# Patient Record
Sex: FEMALE | Race: WHITE | NOT HISPANIC OR LATINO | URBAN - METROPOLITAN AREA
[De-identification: names, ages, dates, MRNs, and addresses within clinical notes are randomized per-mention and may not be internally consistent; named-entity substitution may affect disease eponyms.]

---

## 2018-02-16 ENCOUNTER — EMERGENCY (EMERGENCY)
Facility: HOSPITAL | Age: 83
LOS: 1 days | End: 2018-02-16
Payer: MEDICARE

## 2018-02-16 PROCEDURE — 71046 X-RAY EXAM CHEST 2 VIEWS: CPT | Mod: 26

## 2018-02-16 PROCEDURE — 99284 EMERGENCY DEPT VISIT MOD MDM: CPT

## 2018-03-26 ENCOUNTER — APPOINTMENT (OUTPATIENT)
Dept: ORTHOPEDIC SURGERY | Facility: CLINIC | Age: 83
End: 2018-03-26
Payer: MEDICARE

## 2018-03-26 VITALS
HEART RATE: 54 BPM | DIASTOLIC BLOOD PRESSURE: 79 MMHG | BODY MASS INDEX: 25.19 KG/M2 | SYSTOLIC BLOOD PRESSURE: 157 MMHG | HEIGHT: 58 IN | WEIGHT: 120 LBS

## 2018-03-26 DIAGNOSIS — Z87.39 PERSONAL HISTORY OF OTHER DISEASES OF THE MUSCULOSKELETAL SYSTEM AND CONNECTIVE TISSUE: ICD-10-CM

## 2018-03-26 DIAGNOSIS — Z78.9 OTHER SPECIFIED HEALTH STATUS: ICD-10-CM

## 2018-03-26 DIAGNOSIS — Z82.61 FAMILY HISTORY OF ARTHRITIS: ICD-10-CM

## 2018-03-26 DIAGNOSIS — Z87.891 PERSONAL HISTORY OF NICOTINE DEPENDENCE: ICD-10-CM

## 2018-03-26 DIAGNOSIS — Z86.79 PERSONAL HISTORY OF OTHER DISEASES OF THE CIRCULATORY SYSTEM: ICD-10-CM

## 2018-03-26 DIAGNOSIS — Z80.9 FAMILY HISTORY OF MALIGNANT NEOPLASM, UNSPECIFIED: ICD-10-CM

## 2018-03-26 PROCEDURE — 99203 OFFICE O/P NEW LOW 30 MIN: CPT

## 2018-03-26 PROCEDURE — 73110 X-RAY EXAM OF WRIST: CPT | Mod: 26,50

## 2018-03-26 PROCEDURE — 73130 X-RAY EXAM OF HAND: CPT | Mod: 26,50

## 2018-03-26 RX ORDER — LEVOTHYROXINE SODIUM 0.11 MG/1
112 TABLET ORAL
Refills: 0 | Status: ACTIVE | COMMUNITY

## 2018-04-24 ENCOUNTER — OUTPATIENT (OUTPATIENT)
Dept: OUTPATIENT SERVICES | Facility: HOSPITAL | Age: 83
LOS: 1 days | End: 2018-04-24

## 2018-05-07 ENCOUNTER — OUTPATIENT (OUTPATIENT)
Dept: OUTPATIENT SERVICES | Facility: HOSPITAL | Age: 83
LOS: 1 days | End: 2018-05-07

## 2018-05-07 ENCOUNTER — OUTPATIENT (OUTPATIENT)
Dept: OUTPATIENT SERVICES | Facility: HOSPITAL | Age: 83
LOS: 1 days | End: 2018-05-07
Payer: MEDICARE

## 2018-05-07 ENCOUNTER — APPOINTMENT (OUTPATIENT)
Dept: ORTHOPEDIC SURGERY | Facility: HOSPITAL | Age: 83
End: 2018-05-07

## 2018-05-07 PROCEDURE — 64721 CARPAL TUNNEL SURGERY: CPT | Mod: RT

## 2018-05-14 ENCOUNTER — APPOINTMENT (OUTPATIENT)
Dept: ORTHOPEDIC SURGERY | Facility: CLINIC | Age: 83
End: 2018-05-14
Payer: MEDICARE

## 2018-05-14 VITALS — WEIGHT: 120 LBS | HEIGHT: 58 IN | BODY MASS INDEX: 25.19 KG/M2

## 2018-05-14 PROCEDURE — 99024 POSTOP FOLLOW-UP VISIT: CPT

## 2018-05-16 ENCOUNTER — TRANSCRIPTION ENCOUNTER (OUTPATIENT)
Age: 83
End: 2018-05-16

## 2018-06-08 PROBLEM — Z00.00 ENCOUNTER FOR PREVENTIVE HEALTH EXAMINATION: Status: ACTIVE | Noted: 2018-03-22

## 2018-06-11 ENCOUNTER — APPOINTMENT (OUTPATIENT)
Dept: ORTHOPEDIC SURGERY | Facility: CLINIC | Age: 83
End: 2018-06-11
Payer: MEDICARE

## 2018-06-11 DIAGNOSIS — Z00.00 ENCOUNTER FOR GENERAL ADULT MEDICAL EXAMINATION W/OUT ABNORMAL FINDINGS: ICD-10-CM

## 2018-06-11 PROCEDURE — 99024 POSTOP FOLLOW-UP VISIT: CPT

## 2018-10-29 ENCOUNTER — APPOINTMENT (OUTPATIENT)
Dept: ORTHOPEDIC SURGERY | Facility: CLINIC | Age: 83
End: 2018-10-29
Payer: MEDICARE

## 2018-10-29 VITALS — WEIGHT: 120 LBS | BODY MASS INDEX: 25.19 KG/M2 | HEIGHT: 58 IN

## 2018-10-29 PROCEDURE — 99214 OFFICE O/P EST MOD 30 MIN: CPT

## 2018-11-20 ENCOUNTER — OUTPATIENT (OUTPATIENT)
Dept: OUTPATIENT SERVICES | Facility: HOSPITAL | Age: 83
LOS: 1 days | End: 2018-11-20

## 2018-12-03 ENCOUNTER — OUTPATIENT (OUTPATIENT)
Dept: OUTPATIENT SERVICES | Facility: HOSPITAL | Age: 83
LOS: 1 days | End: 2018-12-03
Payer: MEDICARE

## 2018-12-03 ENCOUNTER — APPOINTMENT (OUTPATIENT)
Dept: ORTHOPEDIC SURGERY | Facility: HOSPITAL | Age: 83
End: 2018-12-03

## 2018-12-03 ENCOUNTER — OUTPATIENT (OUTPATIENT)
Dept: OUTPATIENT SERVICES | Facility: HOSPITAL | Age: 83
LOS: 1 days | End: 2018-12-03

## 2018-12-03 PROCEDURE — 64721 CARPAL TUNNEL SURGERY: CPT | Mod: LT

## 2018-12-11 PROBLEM — G56.02 CARPAL TUNNEL SYNDROME OF LEFT WRIST: Status: ACTIVE | Noted: 2018-03-26

## 2018-12-13 ENCOUNTER — APPOINTMENT (OUTPATIENT)
Dept: ORTHOPEDIC SURGERY | Facility: CLINIC | Age: 83
End: 2018-12-13
Payer: MEDICARE

## 2018-12-13 DIAGNOSIS — G56.02 CARPAL TUNNEL SYNDROME, LEFT UPPER LIMB: ICD-10-CM

## 2018-12-13 PROCEDURE — 73030 X-RAY EXAM OF SHOULDER: CPT | Mod: 26,LT

## 2018-12-13 PROCEDURE — 99024 POSTOP FOLLOW-UP VISIT: CPT

## 2020-09-21 ENCOUNTER — APPOINTMENT (OUTPATIENT)
Dept: CT IMAGING | Facility: CLINIC | Age: 85
End: 2020-09-21
Payer: MEDICARE

## 2020-09-21 PROCEDURE — 82565A: CUSTOM | Mod: QW

## 2020-09-21 PROCEDURE — 74177 CT ABD & PELVIS W/CONTRAST: CPT

## 2020-09-21 PROCEDURE — Q9967E: CUSTOM

## 2022-03-14 ENCOUNTER — EMERGENCY (EMERGENCY)
Facility: HOSPITAL | Age: 87
LOS: 1 days | Discharge: HOME | End: 2022-03-14
Admitting: EMERGENCY MEDICINE
Payer: MEDICARE

## 2022-03-14 PROCEDURE — 99284 EMERGENCY DEPT VISIT MOD MDM: CPT

## 2022-03-14 PROCEDURE — 73030 X-RAY EXAM OF SHOULDER: CPT | Mod: 26,RT

## 2022-03-14 PROCEDURE — 73060 X-RAY EXAM OF HUMERUS: CPT | Mod: 26,RT

## 2022-03-17 DIAGNOSIS — I10 ESSENTIAL (PRIMARY) HYPERTENSION: ICD-10-CM

## 2022-03-17 DIAGNOSIS — M79.601 PAIN IN RIGHT ARM: ICD-10-CM

## 2022-08-02 ENCOUNTER — INPATIENT (INPATIENT)
Facility: HOSPITAL | Age: 87
LOS: 1 days | Discharge: HOME | End: 2022-08-04

## 2022-08-02 ENCOUNTER — OUTPATIENT (OUTPATIENT)
Dept: OUTPATIENT SERVICES | Facility: HOSPITAL | Age: 87
LOS: 1 days | End: 2022-08-02

## 2022-08-02 PROCEDURE — 71045 X-RAY EXAM CHEST 1 VIEW: CPT | Mod: 26

## 2022-08-02 PROCEDURE — 99285 EMERGENCY DEPT VISIT HI MDM: CPT

## 2022-08-02 PROCEDURE — 93010 ELECTROCARDIOGRAM REPORT: CPT

## 2022-08-03 ENCOUNTER — OUTPATIENT (OUTPATIENT)
Dept: OUTPATIENT SERVICES | Facility: HOSPITAL | Age: 87
LOS: 1 days | End: 2022-08-03

## 2022-08-04 ENCOUNTER — OUTPATIENT (OUTPATIENT)
Dept: OUTPATIENT SERVICES | Facility: HOSPITAL | Age: 87
LOS: 1 days | End: 2022-08-04
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
## 2022-08-10 DIAGNOSIS — T46.5X2A POISONING BY OTHER ANTIHYPERTENSIVE DRUGS, INTENTIONAL SELF-HARM, INITIAL ENCOUNTER: ICD-10-CM

## 2022-08-10 DIAGNOSIS — T50.2X5A ADVERSE EFFECT OF CARBONIC-ANHYDRASE INHIBITORS, BENZOTHIADIAZIDES AND OTHER DIURETICS, INITIAL ENCOUNTER: ICD-10-CM

## 2022-08-10 DIAGNOSIS — T39.1X2A POISONING BY 4-AMINOPHENOL DERIVATIVES, INTENTIONAL SELF-HARM, INITIAL ENCOUNTER: ICD-10-CM
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
## 2022-08-10 DIAGNOSIS — F32.A DEPRESSION, UNSPECIFIED: ICD-10-CM

## 2022-08-10 DIAGNOSIS — E03.9 HYPOTHYROIDISM, UNSPECIFIED: ICD-10-CM

## 2022-08-10 DIAGNOSIS — Z87.891 PERSONAL HISTORY OF NICOTINE DEPENDENCE: ICD-10-CM

## 2022-08-10 DIAGNOSIS — Z20.822 CONTACT WITH AND (SUSPECTED) EXPOSURE TO COVID-19: ICD-10-CM

## 2022-08-10 DIAGNOSIS — I10 ESSENTIAL (PRIMARY) HYPERTENSION: ICD-10-CM

## 2022-08-10 DIAGNOSIS — R35.0 FREQUENCY OF MICTURITION: ICD-10-CM

## 2022-08-10 DIAGNOSIS — T50.992A POISONING BY OTHER DRUGS, MEDICAMENTS AND BIOLOGICAL SUBSTANCES, INTENTIONAL SELF-HARM, INITIAL ENCOUNTER: ICD-10-CM

## 2022-08-10 DIAGNOSIS — R25.2 CRAMP AND SPASM: ICD-10-CM
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
## 2022-08-10 DIAGNOSIS — E87.1 HYPO-OSMOLALITY AND HYPONATREMIA: ICD-10-CM

## 2022-08-22 DIAGNOSIS — F41.9 ANXIETY DISORDER, UNSPECIFIED: ICD-10-CM

## 2022-08-22 DIAGNOSIS — F43.0 ACUTE STRESS REACTION: ICD-10-CM

## 2022-08-22 DIAGNOSIS — F32.A DEPRESSION, UNSPECIFIED: ICD-10-CM

## 2022-08-27 DIAGNOSIS — T39.8X2A POISONING BY OTHER NONOPIOID ANALGESICS AND ANTIPYRETICS, NOT ELSEWHERE CLASSIFIED, INTENTIONAL SELF-HARM, INITIAL ENCOUNTER: ICD-10-CM

## 2022-08-27 DIAGNOSIS — E03.9 HYPOTHYROIDISM, UNSPECIFIED: ICD-10-CM

## 2022-08-27 DIAGNOSIS — I10 ESSENTIAL (PRIMARY) HYPERTENSION: ICD-10-CM

## 2022-08-30 DIAGNOSIS — E03.9 HYPOTHYROIDISM, UNSPECIFIED: ICD-10-CM

## 2022-08-30 DIAGNOSIS — I10 ESSENTIAL (PRIMARY) HYPERTENSION: ICD-10-CM

## 2022-08-30 DIAGNOSIS — T39.8X2A POISONING BY OTHER NONOPIOID ANALGESICS AND ANTIPYRETICS, NOT ELSEWHERE CLASSIFIED, INTENTIONAL SELF-HARM, INITIAL ENCOUNTER: ICD-10-CM

## 2022-10-07 ENCOUNTER — NON-APPOINTMENT (OUTPATIENT)
Age: 87
End: 2022-10-07

## 2022-10-07 ENCOUNTER — APPOINTMENT (OUTPATIENT)
Dept: OPHTHALMOLOGY | Facility: CLINIC | Age: 87
End: 2022-10-07

## 2022-10-07 PROCEDURE — 92004 COMPRE OPH EXAM NEW PT 1/>: CPT

## 2022-10-07 PROCEDURE — 92134 CPTRZ OPH DX IMG PST SGM RTA: CPT

## 2022-11-04 ENCOUNTER — NON-APPOINTMENT (OUTPATIENT)
Age: 87
End: 2022-11-04

## 2022-11-04 ENCOUNTER — APPOINTMENT (OUTPATIENT)
Dept: OPHTHALMOLOGY | Facility: CLINIC | Age: 87
End: 2022-11-04

## 2022-11-04 PROCEDURE — 99213 OFFICE O/P EST LOW 20 MIN: CPT

## 2023-06-09 ENCOUNTER — APPOINTMENT (OUTPATIENT)
Dept: OPHTHALMOLOGY | Facility: CLINIC | Age: 88
End: 2023-06-09

## 2023-09-15 ENCOUNTER — RX ONLY (RX ONLY)
Age: 88
End: 2023-09-15

## 2023-09-15 ENCOUNTER — OFFICE (OUTPATIENT)
Dept: URBAN - METROPOLITAN AREA CLINIC 103 | Facility: CLINIC | Age: 88
Setting detail: OPHTHALMOLOGY
End: 2023-09-15
Payer: MEDICARE

## 2023-09-15 DIAGNOSIS — H35.363: ICD-10-CM

## 2023-09-15 DIAGNOSIS — H01.004: ICD-10-CM

## 2023-09-15 DIAGNOSIS — H01.005: ICD-10-CM

## 2023-09-15 DIAGNOSIS — H52.4: ICD-10-CM

## 2023-09-15 DIAGNOSIS — Z96.1: ICD-10-CM

## 2023-09-15 DIAGNOSIS — H01.001: ICD-10-CM

## 2023-09-15 DIAGNOSIS — H00.11: ICD-10-CM

## 2023-09-15 DIAGNOSIS — H01.002: ICD-10-CM

## 2023-09-15 PROCEDURE — 92004 COMPRE OPH EXAM NEW PT 1/>: CPT | Performed by: OPHTHALMOLOGY

## 2023-09-15 PROCEDURE — 92134 CPTRZ OPH DX IMG PST SGM RTA: CPT | Performed by: OPHTHALMOLOGY

## 2023-09-15 PROCEDURE — 92015 DETERMINE REFRACTIVE STATE: CPT | Performed by: OPHTHALMOLOGY

## 2023-09-15 ASSESSMENT — SPHEQUIV_DERIVED
OS_SPHEQUIV: -0.625
OD_SPHEQUIV: -1
OS_SPHEQUIV: -0.5
OD_SPHEQUIV: -1

## 2023-09-15 ASSESSMENT — REFRACTION_CURRENTRX
OS_CYLINDER: -1.50
OD_VPRISM_DIRECTION: BF
OD_AXIS: 087
OD_SPHERE: -0.25
OD_CYLINDER: -1.00
OS_ADD: +2.50
OD_ADD: +2.50
OS_VPRISM_DIRECTION: BF
OD_OVR_VA: 20/
OS_SPHERE: PLANO
OS_AXIS: 084
OS_OVR_VA: 20/

## 2023-09-15 ASSESSMENT — REFRACTION_MANIFEST
OS_AXIS: 085
OD_CYLINDER: -0.50
OD_VA1: 20/40
OD_AXIS: 080
OD_ADD: +2.75
OS_CYLINDER: -1.50
OS_VA1: 20/20
OS_ADD: +2.75
OS_SPHERE: +0.25
OD_SPHERE: -0.75

## 2023-09-15 ASSESSMENT — CONFRONTATIONAL VISUAL FIELD TEST (CVF)
OD_FINDINGS: FULL
OS_FINDINGS: FULL

## 2023-09-15 ASSESSMENT — AXIALLENGTH_DERIVED
OD_AL: 23.9407
OS_AL: 23.8849
OD_AL: 23.9407
OS_AL: 23.8351

## 2023-09-15 ASSESSMENT — REFRACTION_AUTOREFRACTION
OS_CYLINDER: -1.75
OD_SPHERE: -0.75
OS_SPHERE: +0.25
OD_AXIS: 080
OS_AXIS: 085
OD_CYLINDER: -0.50

## 2023-09-15 ASSESSMENT — LID EXAM ASSESSMENTS
OS_BLEPHARITIS: LLL LUL 1+ 2+
OD_BLEPHARITIS: RLL RUL 1+ 2+

## 2023-09-15 ASSESSMENT — KERATOMETRY
OD_AXISANGLE_DEGREES: 027
OD_K2POWER_DIOPTERS: 44.00
OS_K2POWER_DIOPTERS: 43.75
OS_AXISANGLE_DEGREES: 008
OS_K1POWER_DIOPTERS: 43.00
OD_K1POWER_DIOPTERS: 43.25

## 2023-09-15 ASSESSMENT — TONOMETRY
OS_IOP_MMHG: 10
OD_IOP_MMHG: 10

## 2023-09-15 ASSESSMENT — VISUAL ACUITY
OD_BCVA: 20/20-2
OS_BCVA: 20/50-2

## 2023-12-29 ENCOUNTER — APPOINTMENT (OUTPATIENT)
Dept: CARDIOLOGY | Facility: CLINIC | Age: 88
End: 2023-12-29

## 2024-01-09 ENCOUNTER — APPOINTMENT (OUTPATIENT)
Dept: CARDIOLOGY | Facility: CLINIC | Age: 89
End: 2024-01-09
Payer: MEDICARE

## 2024-01-09 VITALS
WEIGHT: 128 LBS | OXYGEN SATURATION: 99 % | SYSTOLIC BLOOD PRESSURE: 110 MMHG | HEART RATE: 61 BPM | BODY MASS INDEX: 26.87 KG/M2 | DIASTOLIC BLOOD PRESSURE: 58 MMHG | HEIGHT: 58 IN

## 2024-01-09 PROCEDURE — 99024 POSTOP FOLLOW-UP VISIT: CPT

## 2024-01-09 RX ORDER — DILTIAZEM HYDROCHLORIDE 120 MG/1
120 TABLET, COATED ORAL
Refills: 0 | Status: DISCONTINUED | COMMUNITY
End: 2024-01-09

## 2024-01-09 RX ORDER — CEPHALEXIN 500 MG/1
500 CAPSULE ORAL 4 TIMES DAILY
Qty: 8 | Refills: 0 | Status: DISCONTINUED | COMMUNITY
Start: 2018-05-14 | End: 2024-01-09

## 2024-01-09 RX ORDER — METOPROLOL SUCCINATE 25 MG/1
25 TABLET, EXTENDED RELEASE ORAL DAILY
Qty: 90 | Refills: 3 | Status: DISCONTINUED | COMMUNITY
Start: 2024-01-09 | End: 2024-01-09

## 2024-01-09 NOTE — PROCEDURE
[Complete Heart Block] : complete heart block [See Device Printout] : See device printout [Pacemaker] : pacemaker [DDD] : DDD [Voltage: ___ volts] : Voltage was [unfilled] volts [Threshold Testing Performed] : Threshold testing was performed [Lead Imp:  ___ohms] : lead impedance was [unfilled] ohms [Sensing Amplitude ___mv] : sensing amplitude was [unfilled] mv [___V @] : [unfilled] V [___ ms] : [unfilled] ms [de-identified] : Medtronic [de-identified] : Thais BOO [id number] [de-identified] : [id number] [de-identified] : 12/22/23 [de-identified] :  [de-identified] : 3.7 years [de-identified] : AP: 78.1% : 99%  Wound CDI with minimal swelling and erythema.  Wound visualized by Dr. Romero.  No apparent infection.  Red flag symptoms that would warrant emergent evaluation discussed.  Pt verbalizes understanding.  1 episode of NSVT.  16 beat.   Stop Cardizem and start Toprol 25mg QD. Establish care with general cardiology.

## 2024-01-16 RX ORDER — METOPROLOL SUCCINATE 50 MG/1
50 TABLET, EXTENDED RELEASE ORAL DAILY
Qty: 90 | Refills: 3 | Status: DISCONTINUED | COMMUNITY
Start: 2024-01-09 | End: 2024-01-16

## 2024-01-25 ENCOUNTER — APPOINTMENT (OUTPATIENT)
Dept: CARDIOLOGY | Facility: CLINIC | Age: 89
End: 2024-01-25
Payer: MEDICARE

## 2024-01-25 VITALS
HEIGHT: 58 IN | HEART RATE: 59 BPM | OXYGEN SATURATION: 98 % | WEIGHT: 129 LBS | DIASTOLIC BLOOD PRESSURE: 50 MMHG | SYSTOLIC BLOOD PRESSURE: 124 MMHG | BODY MASS INDEX: 27.08 KG/M2

## 2024-01-25 DIAGNOSIS — G56.01 CARPAL TUNNEL SYNDROME, RIGHT UPPER LIMB: ICD-10-CM

## 2024-01-25 PROCEDURE — 99215 OFFICE O/P EST HI 40 MIN: CPT

## 2024-01-25 RX ORDER — SPIRONOLACTONE 25 MG/1
25 TABLET ORAL DAILY
Qty: 90 | Refills: 2 | Status: DISCONTINUED | COMMUNITY
End: 2024-01-25

## 2024-01-25 RX ORDER — FUROSEMIDE 40 MG/1
40 TABLET ORAL
Qty: 90 | Refills: 0 | Status: DISCONTINUED | COMMUNITY
End: 2024-01-25

## 2024-01-25 RX ORDER — AMLODIPINE BESYLATE 2.5 MG/1
2.5 TABLET ORAL
Refills: 0 | Status: DISCONTINUED | COMMUNITY
End: 2024-01-25

## 2024-04-03 ENCOUNTER — APPOINTMENT (OUTPATIENT)
Dept: CARDIOLOGY | Facility: CLINIC | Age: 89
End: 2024-04-03
Payer: MEDICARE

## 2024-04-03 VITALS
WEIGHT: 136 LBS | HEART RATE: 60 BPM | DIASTOLIC BLOOD PRESSURE: 68 MMHG | OXYGEN SATURATION: 98 % | SYSTOLIC BLOOD PRESSURE: 136 MMHG | BODY MASS INDEX: 28.55 KG/M2 | HEIGHT: 58 IN

## 2024-04-03 PROCEDURE — 93280 PM DEVICE PROGR EVAL DUAL: CPT

## 2024-04-03 RX ORDER — LOSARTAN POTASSIUM 50 MG/1
50 TABLET, FILM COATED ORAL
Qty: 90 | Refills: 1 | Status: ACTIVE | COMMUNITY
Start: 1900-01-01 | End: 1900-01-01

## 2024-04-03 RX ORDER — HYDROCHLOROTHIAZIDE 25 MG/1
25 TABLET ORAL DAILY
Qty: 90 | Refills: 1 | Status: ACTIVE | COMMUNITY
Start: 2024-02-05 | End: 1900-01-01

## 2024-04-03 RX ORDER — LABETALOL HYDROCHLORIDE 200 MG/1
200 TABLET, FILM COATED ORAL
Qty: 180 | Refills: 3 | Status: ACTIVE | COMMUNITY
Start: 1900-01-01 | End: 1900-01-01

## 2024-04-03 NOTE — PROCEDURE
[Complete Heart Block] : complete heart block [See Device Printout] : See device printout [Pacemaker] : pacemaker [DDD] : DDD [Voltage: ___ volts] : Voltage was [unfilled] volts [Threshold Testing Performed] : Threshold testing was performed [Lead Imp:  ___ohms] : lead impedance was [unfilled] ohms [Sensing Amplitude ___mv] : sensing amplitude was [unfilled] mv [___V @] : [unfilled] V [___ ms] : [unfilled] ms [de-identified] : Medtronic [de-identified] : Thais BOO [id number] [de-identified] : [id number] [de-identified] : 12/22/23 [de-identified] :  [de-identified] : 8.6 years [de-identified] : AP: 84.2% : 99.7%  1 episode of NSVT.  10 beat.  Asymptomatic.  On labetolol.   Improved from prior interrrogation.   3 month DRC 6 month DVC

## 2024-04-22 PROBLEM — M25.471 ANKLE EDEMA, BILATERAL: Status: ACTIVE | Noted: 2024-02-05

## 2024-04-25 ENCOUNTER — APPOINTMENT (OUTPATIENT)
Dept: CARDIOLOGY | Facility: CLINIC | Age: 89
End: 2024-04-25
Payer: MEDICARE

## 2024-04-25 VITALS
DIASTOLIC BLOOD PRESSURE: 70 MMHG | SYSTOLIC BLOOD PRESSURE: 152 MMHG | WEIGHT: 137 LBS | BODY MASS INDEX: 28.76 KG/M2 | HEART RATE: 60 BPM | HEIGHT: 58 IN | OXYGEN SATURATION: 98 %

## 2024-04-25 DIAGNOSIS — I44.2 ATRIOVENTRICULAR BLOCK, COMPLETE: ICD-10-CM

## 2024-04-25 DIAGNOSIS — I47.29 OTHER VENTRICULAR TACHYCARDIA: ICD-10-CM

## 2024-04-25 DIAGNOSIS — M25.472 EFFUSION, RIGHT ANKLE: ICD-10-CM

## 2024-04-25 DIAGNOSIS — S46.912S STRAIN OF UNSPECIFIED MUSCLE, FASCIA AND TENDON AT SHOULDER AND UPPER ARM LEVEL, LEFT ARM, SEQUELA: ICD-10-CM

## 2024-04-25 DIAGNOSIS — M25.471 EFFUSION, RIGHT ANKLE: ICD-10-CM

## 2024-04-25 PROCEDURE — G2211 COMPLEX E/M VISIT ADD ON: CPT

## 2024-04-25 PROCEDURE — 99215 OFFICE O/P EST HI 40 MIN: CPT

## 2024-04-25 RX ORDER — MULTIVITAMIN
TABLET ORAL
Refills: 0 | Status: ACTIVE | COMMUNITY

## 2024-04-25 NOTE — DISCUSSION/SUMMARY
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  Prior soft BPs with dizziness, discontinued Lasix, Aldactone, amlodipine.  - Chronic low back pain limiting ambulation, patient will follow-up with PCP for pain management referral  Advised patient to follow active lifestyle with regular cardiovascular exercise. Patient educated on heart healthy diet. Recommend increased oral hydration with electrolyte supplement drinks, avoid excess alcohol and caffeine.  Patient is aware to call with any symptoms or concerns.   Cardiology follow-up 6 months same-day pacemaker check and echocardiogram, labs ordered.  Order will follow-up with Dr. Mikala Bradley for primary care  Total time spent 45 minutes, reviewing of test results, chart information, patient discussion, physical exam and completion of chart documentation.

## 2024-04-25 NOTE — REASON FOR VISIT
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  No history of CAD, MI, revascularization, VHD, CHF, TIA, CVA, diabetes, PVD, DVT, PE, arrhythmia, AF.  Patient has dyspnea with moderate exertion.  Patient has dizziness with head position likely vertigo.  Cardiovascular review of symptoms is negative for exertional chest pain, palpitations or syncope.  No PND or orthopnea leg edema.  No bleeding or black stool.  No exercise routine.  Patient is walking less than 5 minutes, limited ambulation with back pain  EKG Dec 2023 a sensing V pacing  Echocardiogram [facility] 12/22/2023 LVEF 60 to 65%, moderate MR, severe LAE, moderate PAH

## 2024-05-04 ENCOUNTER — LABORATORY RESULT (OUTPATIENT)
Age: 89
End: 2024-05-04

## 2024-07-03 ENCOUNTER — APPOINTMENT (OUTPATIENT)
Dept: CARDIOLOGY | Facility: CLINIC | Age: 89
End: 2024-07-03

## 2024-07-05 ENCOUNTER — APPOINTMENT (OUTPATIENT)
Dept: CARDIOLOGY | Facility: CLINIC | Age: 89
End: 2024-07-05
Payer: MEDICARE

## 2024-07-05 ENCOUNTER — NON-APPOINTMENT (OUTPATIENT)
Age: 89
End: 2024-07-05

## 2024-07-05 PROCEDURE — 93294 REM INTERROG EVL PM/LDLS PM: CPT

## 2024-07-05 PROCEDURE — 93296 REM INTERROG EVL PM/IDS: CPT

## 2024-10-03 ENCOUNTER — APPOINTMENT (OUTPATIENT)
Dept: CARDIOLOGY | Facility: CLINIC | Age: 89
End: 2024-10-03

## 2024-10-16 ENCOUNTER — APPOINTMENT (OUTPATIENT)
Dept: CARDIOLOGY | Facility: CLINIC | Age: 89
End: 2024-10-16
Payer: MEDICARE

## 2024-10-16 VITALS
HEIGHT: 58 IN | WEIGHT: 137 LBS | SYSTOLIC BLOOD PRESSURE: 142 MMHG | HEART RATE: 60 BPM | BODY MASS INDEX: 28.76 KG/M2 | DIASTOLIC BLOOD PRESSURE: 70 MMHG | OXYGEN SATURATION: 98 %

## 2024-10-16 DIAGNOSIS — I44.2 ATRIOVENTRICULAR BLOCK, COMPLETE: ICD-10-CM

## 2024-10-16 DIAGNOSIS — I47.29 OTHER VENTRICULAR TACHYCARDIA: ICD-10-CM

## 2024-10-16 PROCEDURE — 93306 TTE W/DOPPLER COMPLETE: CPT

## 2024-10-16 PROCEDURE — 93280 PM DEVICE PROGR EVAL DUAL: CPT

## 2024-10-16 PROCEDURE — 99214 OFFICE O/P EST MOD 30 MIN: CPT | Mod: 25

## 2024-10-16 PROCEDURE — 76376 3D RENDER W/INTRP POSTPROCES: CPT

## 2024-10-17 NOTE — PROCEDURE
[Complete Heart Block] : complete heart block [See Device Printout] : See device printout [Pacemaker] : pacemaker [DDD] : DDD [Voltage: ___ volts] : Voltage was [unfilled] volts [Threshold Testing Performed] : Threshold testing was performed [Lead Imp:  ___ohms] : lead impedance was [unfilled] ohms [Sensing Amplitude ___mv] : sensing amplitude was [unfilled] mv [___V @] : [unfilled] V [___ ms] : [unfilled] ms [de-identified] : Medtronic [de-identified] : Rosio ZHU [id number] [de-identified] : [id number] [de-identified] : 12/22/23 [de-identified] :  [de-identified] : 11.2 years [de-identified] : AP: 85.9% : 99%  2 episodes of NSVT.  9 & 10 beat.  Asymptomatic.  On labetolol.  Hx of same.  Intolerant to metoprolol.  Normal EF.  Refusing medication changes at this time.  Red flag symptoms that would warrant emergent evaluation discussed.  Pt and daughter verbalizes understanding.  3 month DRC 6 month DVC

## 2024-10-17 NOTE — PROCEDURE
[Complete Heart Block] : complete heart block [See Device Printout] : See device printout [Pacemaker] : pacemaker [DDD] : DDD [Voltage: ___ volts] : Voltage was [unfilled] volts [Threshold Testing Performed] : Threshold testing was performed [Lead Imp:  ___ohms] : lead impedance was [unfilled] ohms [Sensing Amplitude ___mv] : sensing amplitude was [unfilled] mv [___V @] : [unfilled] V [___ ms] : [unfilled] ms [de-identified] : Medtronic [de-identified] : Rosio ZHU [id number] [de-identified] : [id number] [de-identified] : 12/22/23 [de-identified] :  [de-identified] : 11.2 years [de-identified] : AP: 85.9% : 99%  2 episodes of NSVT.  9 & 10 beat.  Asymptomatic.  On labetolol.  Hx of same.  Intolerant to metoprolol.  Normal EF.  Refusing medication changes at this time.  Red flag symptoms that would warrant emergent evaluation discussed.  Pt and daughter verbalizes understanding.  3 month DRC 6 month DVC

## 2024-10-18 NOTE — PHYSICAL EXAM
[Well Developed] : well developed [Well Nourished] : well nourished [No Acute Distress] : no acute distress [Frail] : frail [Normal S1, S2] : normal S1, S2 [No Murmur] : no murmur [No Rub] : no rub [No Gallop] : no gallop [Clear Lung Fields] : clear lung fields [Good Air Entry] : good air entry [No Respiratory Distress] : no respiratory distress  [Normal Bowel Sounds] : normal bowel sounds [No Edema] : no edema [No Cyanosis] : no cyanosis [No Varicosities] : no varicosities [Moves all extremities] : moves all extremities [Alert and Oriented] : alert and oriented [No Clubbing] : no clubbing [Normal Speech] : normal speech [de-identified] : Slow gait.  Walks with cane/walker.

## 2024-10-18 NOTE — PHYSICAL EXAM
[Well Developed] : well developed [Well Nourished] : well nourished [No Acute Distress] : no acute distress [Frail] : frail [Normal S1, S2] : normal S1, S2 [No Murmur] : no murmur [No Rub] : no rub [No Gallop] : no gallop [Clear Lung Fields] : clear lung fields [Good Air Entry] : good air entry [No Respiratory Distress] : no respiratory distress  [Normal Bowel Sounds] : normal bowel sounds [No Edema] : no edema [No Cyanosis] : no cyanosis [No Varicosities] : no varicosities [Moves all extremities] : moves all extremities [Alert and Oriented] : alert and oriented [No Clubbing] : no clubbing [Normal Speech] : normal speech [de-identified] : Slow gait.  Walks with cane/walker.

## 2024-10-18 NOTE — PHYSICAL EXAM
[Well Developed] : well developed [Well Nourished] : well nourished [No Acute Distress] : no acute distress [Frail] : frail [Normal S1, S2] : normal S1, S2 [No Murmur] : no murmur [No Rub] : no rub [No Gallop] : no gallop [Clear Lung Fields] : clear lung fields [Good Air Entry] : good air entry [No Respiratory Distress] : no respiratory distress  [Normal Bowel Sounds] : normal bowel sounds [No Edema] : no edema [No Cyanosis] : no cyanosis [No Varicosities] : no varicosities [Moves all extremities] : moves all extremities [Alert and Oriented] : alert and oriented [No Clubbing] : no clubbing [Normal Speech] : normal speech [de-identified] : Slow gait.  Walks with cane/walker.

## 2024-10-18 NOTE — DISCUSSION/SUMMARY
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  BP has now been elevated for the past 2 visits.  Discussed restarting low dose amlodipine, but pt wishes to not take any more medications.  NSVT noted on device check.  She was intolerant to metoprolol.  Does not want to adjust meds.  Continue to monitor.  Normal EF on echo today.   - Chronic low back pain limiting ambulation.  Given contact information for Dr. Oneil.  Physiatrist.   Advised patient to follow active lifestyle with regular cardiovascular exercise. Patient educated on heart healthy diet. Recommend increased oral hydration with electrolyte supplement drinks, avoid excess alcohol and caffeine.  Patient is aware to call with any symptoms or concerns.   Cardiology follow-up 6 months same-day pacemaker check.

## 2024-10-18 NOTE — REASON FOR VISIT
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  No history of CAD, MI, revascularization, VHD, CHF, TIA, CVA, diabetes, PVD, DVT, PE, arrhythmia, AF.  Patient has dyspnea with moderate exertion.  Patient has dizziness with head position likely vertigo.   Major limitation is low back pain. Cardiovascular review of symptoms is negative for exertional chest pain, palpitations or syncope.  No PND or orthopnea leg edema.  No bleeding or black stool.  No exercise routine.  Patient is walking less than 5 minutes, limited ambulation with back pain  Device interrogation today with normal function.  2 episodes of NSVT noted.  9 beat and 16 beat.  Asymptomatic.  Pt intolerant to metoprolol with "not feeling right".  On Labetalol.    Echocardiogram 10/16/24:  Left ventricular systolic function is normal with an ejection fraction of 64 % with an ejection fraction visually estimated at 60 to 65 %.  There is moderate (grade 2) left ventricular diastolic dysfunction.  Left atrium is moderately dilated.  Mild to moderate mitral regurgitation.  Mild tricuspid regurgitation.   No pericardial effusion seen.  Estimated pulmonary artery systolic pressure is 29 mmHg.  Compared to the transthoracic echocardiogram performed on 12/22/2023, Decrease in PASP.  Echocardiogram [facility] 12/22/2023 LVEF 60 to 65%, moderate MR, severe LAE, moderate PAH  EKG Dec 2023 a sensing V pacing

## 2024-10-26 ENCOUNTER — OFFICE (OUTPATIENT)
Dept: URBAN - METROPOLITAN AREA CLINIC 38 | Facility: CLINIC | Age: 89
Setting detail: OPHTHALMOLOGY
End: 2024-10-26
Payer: MEDICARE

## 2024-10-26 DIAGNOSIS — Z96.1: ICD-10-CM

## 2024-10-26 DIAGNOSIS — H01.002: ICD-10-CM

## 2024-10-26 DIAGNOSIS — H01.004: ICD-10-CM

## 2024-10-26 DIAGNOSIS — H52.4: ICD-10-CM

## 2024-10-26 DIAGNOSIS — H35.3132: ICD-10-CM

## 2024-10-26 DIAGNOSIS — H01.005: ICD-10-CM

## 2024-10-26 DIAGNOSIS — H43.392: ICD-10-CM

## 2024-10-26 DIAGNOSIS — H01.001: ICD-10-CM

## 2024-10-26 DIAGNOSIS — H43.812: ICD-10-CM

## 2024-10-26 PROCEDURE — 92014 COMPRE OPH EXAM EST PT 1/>: CPT | Performed by: OPHTHALMOLOGY

## 2024-10-26 PROCEDURE — 92134 CPTRZ OPH DX IMG PST SGM RTA: CPT | Performed by: OPHTHALMOLOGY

## 2024-10-26 PROCEDURE — 92015 DETERMINE REFRACTIVE STATE: CPT | Performed by: OPHTHALMOLOGY

## 2024-10-26 ASSESSMENT — REFRACTION_CURRENTRX
OD_SPHERE: -0.25
OS_OVR_VA: 20/
OD_VPRISM_DIRECTION: BF
OS_CYLINDER: -0.75
OD_CYLINDER: -0.75
OD_OVR_VA: 20/
OS_VPRISM_DIRECTION: BF
OS_CYLINDER: -1.50
OD_ADD: +2.75
OS_VPRISM_DIRECTION: BF
OD_AXIS: 058
OD_AXIS: 087
OS_SPHERE: PLANO
OS_OVR_VA: 20/
OD_ADD: +2.50
OS_ADD: +2.75
OS_AXIS: 093
OD_VPRISM_DIRECTION: BF
OS_SPHERE: +0.25
OS_ADD: +2.50
OS_AXIS: 084
OD_CYLINDER: -1.00
OD_OVR_VA: 20/
OD_SPHERE: -0.50

## 2024-10-26 ASSESSMENT — VISUAL ACUITY
OS_BCVA: 20/60-
OD_BCVA: 20/30-

## 2024-10-26 ASSESSMENT — REFRACTION_MANIFEST
OS_AXIS: 080
OS_VA1: 20/30-2
OS_ADD: +2.75
OD_VA1: 20/40-2
OS_CYLINDER: -1.50
OU_VA: 20/30-
OD_ADD: +2.75
OD_AXIS: 115
OD_AXIS: 115
OS_VA1: 20/30-2
OS_SPHERE: PLANO
OS_ADD: +3.00
OU_VA: 20/30-
OD_SPHERE: -0.75
OD_CYLINDER: -0.50
OS_VA2: 20/25(J1)
OD_SPHERE: -0.75
OS_SPHERE: PLANO
OS_VA2: 20/25(J1)
OD_CYLINDER: -0.50
OD_VA1: 20/40-2
OS_CYLINDER: -1.50
OD_VA2: 20/25(J1)
OD_ADD: +3.00
OD_VA2: 20/25(J1)
OS_AXIS: 080

## 2024-10-26 ASSESSMENT — CONFRONTATIONAL VISUAL FIELD TEST (CVF)
OS_FINDINGS: FULL
OD_FINDINGS: FULL

## 2024-10-26 ASSESSMENT — KERATOMETRY
OS_AXISANGLE_DEGREES: 166
OS_K1POWER_DIOPTERS: 42.50
OD_K2POWER_DIOPTERS: 44.00
OS_K2POWER_DIOPTERS: 43.50
OD_K1POWER_DIOPTERS: 42.75
OD_AXISANGLE_DEGREES: 020

## 2024-10-26 ASSESSMENT — REFRACTION_AUTOREFRACTION
OD_SPHERE: +0.50
OD_AXIS: 113
OS_AXIS: 078
OS_CYLINDER: -2.75
OD_CYLINDER: -1.75
OS_SPHERE: +1.25

## 2024-10-26 ASSESSMENT — LID EXAM ASSESSMENTS
OD_BLEPHARITIS: RLL RUL 1+ 2+
OS_BLEPHARITIS: LLL LUL 1+ 2+

## 2024-10-26 ASSESSMENT — TONOMETRY: OD_IOP_MMHG: 10

## 2024-12-18 ENCOUNTER — APPOINTMENT (OUTPATIENT)
Dept: CARDIOLOGY | Facility: CLINIC | Age: 88
End: 2024-12-18
Payer: MEDICARE

## 2024-12-18 ENCOUNTER — NON-APPOINTMENT (OUTPATIENT)
Age: 88
End: 2024-12-18

## 2024-12-18 PROCEDURE — 93294 REM INTERROG EVL PM/LDLS PM: CPT

## 2024-12-18 PROCEDURE — 93296 REM INTERROG EVL PM/IDS: CPT

## 2025-03-20 ENCOUNTER — APPOINTMENT (OUTPATIENT)
Dept: CARDIOLOGY | Facility: CLINIC | Age: 89
End: 2025-03-20
Payer: MEDICARE

## 2025-03-20 ENCOUNTER — NON-APPOINTMENT (OUTPATIENT)
Age: 89
End: 2025-03-20

## 2025-03-20 PROCEDURE — 93296 REM INTERROG EVL PM/IDS: CPT

## 2025-03-20 PROCEDURE — 93294 REM INTERROG EVL PM/LDLS PM: CPT

## 2025-03-31 NOTE — REASON FOR VISIT
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  No history of CAD, MI, revascularization, VHD, CHF, TIA, CVA, diabetes, PVD, DVT, PE, arrhythmia, AF.  Patient has dyspnea with moderate exertion.  Patient has dizziness with head position likely vertigo.  Cardiovascular review of symptoms is negative for exertional chest pain, palpitations or syncope.  No PND or orthopnea leg edema.  No bleeding or black stool.  No exercise routine.  Patient is walking less than 5 minutes, limited ambulation with back pain  EKG Dec 2023 a sensing V pacing  Echocardiogram [facility] 12/22/2023 LVEF 60 to 65%, moderate MR, severe LAE, moderate PAH

## 2025-04-05 ENCOUNTER — NON-APPOINTMENT (OUTPATIENT)
Age: 89
End: 2025-04-05

## 2025-04-07 ENCOUNTER — APPOINTMENT (OUTPATIENT)
Dept: CARDIOLOGY | Facility: CLINIC | Age: 89
End: 2025-04-07
Payer: MEDICARE

## 2025-04-07 ENCOUNTER — NON-APPOINTMENT (OUTPATIENT)
Age: 89
End: 2025-04-07

## 2025-04-07 VITALS
WEIGHT: 140 LBS | HEART RATE: 60 BPM | DIASTOLIC BLOOD PRESSURE: 60 MMHG | OXYGEN SATURATION: 97 % | SYSTOLIC BLOOD PRESSURE: 136 MMHG | BODY MASS INDEX: 29.39 KG/M2 | HEIGHT: 58 IN

## 2025-04-07 DIAGNOSIS — M25.471 EFFUSION, RIGHT ANKLE: ICD-10-CM

## 2025-04-07 DIAGNOSIS — S46.912S STRAIN OF UNSPECIFIED MUSCLE, FASCIA AND TENDON AT SHOULDER AND UPPER ARM LEVEL, LEFT ARM, SEQUELA: ICD-10-CM

## 2025-04-07 DIAGNOSIS — I47.29 OTHER VENTRICULAR TACHYCARDIA: ICD-10-CM

## 2025-04-07 DIAGNOSIS — M25.472 EFFUSION, RIGHT ANKLE: ICD-10-CM

## 2025-04-07 DIAGNOSIS — I44.2 ATRIOVENTRICULAR BLOCK, COMPLETE: ICD-10-CM

## 2025-04-07 PROCEDURE — 99204 OFFICE O/P NEW MOD 45 MIN: CPT

## 2025-04-07 PROCEDURE — 93000 ELECTROCARDIOGRAM COMPLETE: CPT | Mod: XU

## 2025-04-07 PROCEDURE — 93280 PM DEVICE PROGR EVAL DUAL: CPT

## 2025-04-07 RX ORDER — LEVOTHYROXINE SODIUM 0.14 MG/1
137 TABLET ORAL DAILY
Refills: 0 | Status: ACTIVE | COMMUNITY

## 2025-04-07 NOTE — DISCUSSION/SUMMARY
[FreeTextEntry1] : BYRON BENITEZ is a 89 year old F who presents today Apr 07, 2025 with the above history and the following active issues:  Chest pains. Edema. Rec echo and pharm nuke on meds. See Dr. Valentino to review.  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 Device checks with our office.  -Hypertension on labetalol, losartan, HCTZ, Hx of soft BPs with dizziness, discontinued Lasix, Aldactone, amlodipine. Pt wishes to not take any more medications. NSVT noted on device check. She was intolerant to metoprolol. Does not want to adjust meds. Continue to monitor. Update echo.  - Chronic low back pain limiting ambulation. Given contact information for Dr. Oneil. Physiatrist.  Ongoing f/u with PCP.  F/U after testing to review results. Discussed red flag symptoms, which would warrant sooner or emergent medical evaluation. Any questions and concerns were addressed and resolved.  Sincerely, Marlene Schaffer [facility] Patient's history, testing, and plan was reviewed with supervising physician, Dr. Patrick Valentino

## 2025-04-07 NOTE — HISTORY OF PRESENT ILLNESS
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  Pt intolerant to metoprolol with "not feeling right". On Labetalol.  Last seen 10/16/24. Here today for DVC and OV. See DVC not for more details. In the interim there have been no hospitalizations or procedures. Reports a nonexertional chest pain that happened in bed, 15-20 minutes. Nonradiating. Sometimes also happens if she is moving in bed a certain way. Also reports BLLE edema. Denies SOB, palpitations, dizziness, lightheadedness, syncope, and claudication. Former smoker.  See DVC note for more details.  Testing:  EKG 4/7/25: Paced  Labs 3/31/25: WBC 8.3, Hgb 11.6, HCT 35.5, plt 194, A1C 5.6, TSH 9.83, LDL 98, Chol 199, HDL 76, Trigs 151, Na 139, K 4.3, ALT 19, AST 29, Cr 1.21, Ca 9.9.  Echo 10/2024: CONCLUSIONS: 1. Left ventricular systolic function is normal with an ejection fraction of 64 % with an ejection fraction  visually estimated at 60 to 65 %. 2. There is moderate (grade 2) left ventricular diastolic dysfunction. 3. Left atrium is moderately dilated. 4. Mild to moderate mitral regurgitation. 5. Mild tricuspid regurgitation. 6. No pericardial effusion seen. 7. Estimated pulmonary artery systolic pressure is 29 mmHg. 8. Compared to the transthoracic echocardiogram performed on 12/22/2023, Decrease in PASP.  Labs 9/2024: HCT 34.1, plt 192, WBC 7.34, Hgb 11.4, A1C 5.7, TSH 8.31, , HDL 74, Chol 207, Trigs 157, AST 25, Na 143, K 4.2, Cr 1.32, Ca 10.4, ALT 20,

## 2025-04-07 NOTE — DISCUSSION/SUMMARY
[FreeTextEntry1] : BYRON BENITEZ is a 89 year old F who presents today Apr 07, 2025 with the above history and the following active issues:  Chest pains. Edema. Rec echo and pharm nuke on meds. See Dr. Valentino to review.  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 Device checks with our office.  -Hypertension on labetalol, losartan, HCTZ, Hx of soft BPs with dizziness, discontinued Lasix, Aldactone, amlodipine. Pt wishes to not take any more medications. NSVT noted on device check. She was intolerant to metoprolol. Does not want to adjust meds. Continue to monitor. Update echo.  - Chronic low back pain limiting ambulation. Given contact information for Dr. Oneil. Physiatrist.  Ongoing f/u with PCP.  F/U after testing to review results. Discussed red flag symptoms, which would warrant sooner or emergent medical evaluation. Any questions and concerns were addressed and resolved.  Sincerely, Marlene Schaffer [facility] Patient's history, testing, and plan was reviewed with supervising physician, Dr. Patrick Valentino

## 2025-04-07 NOTE — REVIEW OF SYSTEMS
[Dyspnea on exertion] : dyspnea during exertion [Dizziness] : dizziness [Negative] : Heme/Lymph [FreeTextEntry5] : see HPI

## 2025-04-07 NOTE — PROCEDURE
[Complete Heart Block] : complete heart block [See Device Printout] : See device printout [Pacemaker] : pacemaker [DDD] : DDD [Threshold Testing Performed] : Threshold testing was performed [Lead Imp:  ___ohms] : lead impedance was [unfilled] ohms [Sensing Amplitude ___mv] : sensing amplitude was [unfilled] mv [___V @] : [unfilled] V [___ ms] : [unfilled] ms [de-identified] : Medtronic [de-identified] : Rosio ZHU [id number] [de-identified] : [id number] [de-identified] : 12/22/23 [de-identified] :  [de-identified] : 10.7 years [de-identified] : AP: 85.5% : 98%  AT/AF <0.1%  1 episode of NSVT, 1 second, rate 171 bpm.  See OV note for more details.  Settings: RA: Amplitude 1.75V (auto), pulse width 0.4ms (auto), sensitivity 0.3mV RV: Amplitude 2V (auto), pulse width 0.4ms (auto), sensitivity 0.9mV  DRC in 3 months and DVC in 6 months. Task sent to remote team.  F/U as above. Discussed red flag symptoms, which would warrant sooner or emergent medical evaluation. Any questions and concerns were addressed and resolved..fu  Sincerely, Marlene Schaffer [facility] Patient's history, testing, and plan was reviewed with supervising physician, Dr. Patrick Valentino

## 2025-04-07 NOTE — PHYSICAL EXAM
[Frail] : frail [Well Developed] : well developed [Well Nourished] : well nourished [No Acute Distress] : no acute distress [Normal Conjunctiva] : normal conjunctiva [Normal Venous Pressure] : normal venous pressure [No Carotid Bruit] : no carotid bruit [Normal S1, S2] : normal S1, S2 [No Murmur] : no murmur [No Rub] : no rub [No Gallop] : no gallop [Clear Lung Fields] : clear lung fields [Good Air Entry] : good air entry [No Respiratory Distress] : no respiratory distress  [Soft] : abdomen soft [Non Tender] : non-tender [No Masses/organomegaly] : no masses/organomegaly [Normal Bowel Sounds] : normal bowel sounds [Normal Gait] : normal gait [No Edema] : no edema [No Cyanosis] : no cyanosis [No Clubbing] : no clubbing [No Varicosities] : no varicosities [No Rash] : no rash [No Skin Lesions] : no skin lesions [Moves all extremities] : moves all extremities [No Focal Deficits] : no focal deficits [Normal Speech] : normal speech [Alert and Oriented] : alert and oriented [Normal memory] : normal memory [de-identified] : +1 BLLE edema

## 2025-04-07 NOTE — PHYSICAL EXAM
[Frail] : frail [Well Developed] : well developed [Well Nourished] : well nourished [No Acute Distress] : no acute distress [Normal Conjunctiva] : normal conjunctiva [Normal Venous Pressure] : normal venous pressure [No Carotid Bruit] : no carotid bruit [Normal S1, S2] : normal S1, S2 [No Murmur] : no murmur [No Rub] : no rub [No Gallop] : no gallop [Clear Lung Fields] : clear lung fields [Good Air Entry] : good air entry [No Respiratory Distress] : no respiratory distress  [Soft] : abdomen soft [Non Tender] : non-tender [No Masses/organomegaly] : no masses/organomegaly [Normal Bowel Sounds] : normal bowel sounds [Normal Gait] : normal gait [No Edema] : no edema [No Cyanosis] : no cyanosis [No Clubbing] : no clubbing [No Varicosities] : no varicosities [No Rash] : no rash [No Skin Lesions] : no skin lesions [Moves all extremities] : moves all extremities [No Focal Deficits] : no focal deficits [Normal Speech] : normal speech [Alert and Oriented] : alert and oriented [Normal memory] : normal memory [de-identified] : +1 BLLE edema

## 2025-04-07 NOTE — PHYSICAL EXAM
[Frail] : frail [Well Developed] : well developed [Well Nourished] : well nourished [No Acute Distress] : no acute distress [Normal Conjunctiva] : normal conjunctiva [Normal Venous Pressure] : normal venous pressure [No Carotid Bruit] : no carotid bruit [Normal S1, S2] : normal S1, S2 [No Murmur] : no murmur [No Rub] : no rub [No Gallop] : no gallop [Clear Lung Fields] : clear lung fields [Good Air Entry] : good air entry [No Respiratory Distress] : no respiratory distress  [Soft] : abdomen soft [Non Tender] : non-tender [No Masses/organomegaly] : no masses/organomegaly [Normal Bowel Sounds] : normal bowel sounds [Normal Gait] : normal gait [No Edema] : no edema [No Cyanosis] : no cyanosis [No Clubbing] : no clubbing [No Varicosities] : no varicosities [No Rash] : no rash [No Skin Lesions] : no skin lesions [Moves all extremities] : moves all extremities [No Focal Deficits] : no focal deficits [Normal Speech] : normal speech [Alert and Oriented] : alert and oriented [Normal memory] : normal memory [de-identified] : +1 BLLE edema

## 2025-05-05 ENCOUNTER — APPOINTMENT (OUTPATIENT)
Dept: CARDIOLOGY | Facility: CLINIC | Age: 89
End: 2025-05-05

## 2025-05-06 ENCOUNTER — APPOINTMENT (OUTPATIENT)
Dept: CARDIOLOGY | Facility: CLINIC | Age: 89
End: 2025-05-06

## 2025-05-06 DIAGNOSIS — M25.472 EFFUSION, RIGHT ANKLE: ICD-10-CM

## 2025-05-06 DIAGNOSIS — I44.2 ATRIOVENTRICULAR BLOCK, COMPLETE: ICD-10-CM

## 2025-05-06 DIAGNOSIS — G56.02 CARPAL TUNNEL SYNDROME, LEFT UPPER LIMB: ICD-10-CM

## 2025-05-06 DIAGNOSIS — I47.29 OTHER VENTRICULAR TACHYCARDIA: ICD-10-CM

## 2025-05-06 DIAGNOSIS — G56.01 CARPAL TUNNEL SYNDROME, RIGHT UPPER LIMB: ICD-10-CM

## 2025-05-06 DIAGNOSIS — M25.471 EFFUSION, RIGHT ANKLE: ICD-10-CM

## 2025-05-06 DIAGNOSIS — S46.912S STRAIN OF UNSPECIFIED MUSCLE, FASCIA AND TENDON AT SHOULDER AND UPPER ARM LEVEL, LEFT ARM, SEQUELA: ICD-10-CM

## 2025-05-26 ENCOUNTER — RX RENEWAL (OUTPATIENT)
Age: 89
End: 2025-05-26

## 2025-07-14 ENCOUNTER — APPOINTMENT (OUTPATIENT)
Dept: CARDIOLOGY | Facility: CLINIC | Age: 89
End: 2025-07-14
Payer: MEDICARE

## 2025-07-14 ENCOUNTER — NON-APPOINTMENT (OUTPATIENT)
Age: 89
End: 2025-07-14

## 2025-07-14 PROCEDURE — 93296 REM INTERROG EVL PM/IDS: CPT

## 2025-07-14 PROCEDURE — 93294 REM INTERROG EVL PM/LDLS PM: CPT
